# Patient Record
Sex: MALE | Race: WHITE | NOT HISPANIC OR LATINO | Employment: OTHER | URBAN - METROPOLITAN AREA
[De-identification: names, ages, dates, MRNs, and addresses within clinical notes are randomized per-mention and may not be internally consistent; named-entity substitution may affect disease eponyms.]

---

## 2017-02-06 ENCOUNTER — OFFICE VISIT (OUTPATIENT)
Dept: OPHTHALMOLOGY | Facility: CLINIC | Age: 82
End: 2017-02-06
Payer: MEDICARE

## 2017-02-06 DIAGNOSIS — H52.7 REFRACTIVE ERROR: ICD-10-CM

## 2017-02-06 DIAGNOSIS — E11.65 UNCONTROLLED TYPE 2 DIABETES MELLITUS WITH BOTH EYES AFFECTED BY RETINOPATHY WITHOUT MACULAR EDEMA, WITH LONG-TERM CURRENT USE OF INSULIN, UNSPECIFIED RETINOPATHY SEVERITY: Primary | ICD-10-CM

## 2017-02-06 DIAGNOSIS — H04.129 DRY EYE: ICD-10-CM

## 2017-02-06 DIAGNOSIS — Z96.1 PSEUDOPHAKIA: ICD-10-CM

## 2017-02-06 DIAGNOSIS — E11.319 UNCONTROLLED TYPE 2 DIABETES MELLITUS WITH BOTH EYES AFFECTED BY RETINOPATHY WITHOUT MACULAR EDEMA, WITH LONG-TERM CURRENT USE OF INSULIN, UNSPECIFIED RETINOPATHY SEVERITY: Primary | ICD-10-CM

## 2017-02-06 PROCEDURE — 99212 OFFICE O/P EST SF 10 MIN: CPT | Mod: PBBFAC,PO | Performed by: OPHTHALMOLOGY

## 2017-02-06 PROCEDURE — 92014 COMPRE OPH EXAM EST PT 1/>: CPT | Mod: S$PBB,,, | Performed by: OPHTHALMOLOGY

## 2017-02-06 PROCEDURE — 99999 PR PBB SHADOW E&M-EST. PATIENT-LVL II: CPT | Mod: PBBFAC,,, | Performed by: OPHTHALMOLOGY

## 2017-02-06 RX ORDER — ATORVASTATIN CALCIUM 40 MG/1
TABLET, FILM COATED ORAL
COMMUNITY
Start: 2017-01-01

## 2017-02-06 RX ORDER — MUPIROCIN 20 MG/G
OINTMENT TOPICAL
COMMUNITY
Start: 2015-04-26

## 2017-02-06 RX ORDER — GABAPENTIN 600 MG/1
TABLET ORAL
COMMUNITY
Start: 2017-02-02 | End: 2017-05-02 | Stop reason: SDUPTHER

## 2017-02-06 RX ORDER — MULTIVITAMIN
1 TABLET ORAL
COMMUNITY

## 2017-02-06 RX ORDER — FERROUS SULFATE 325(65) MG
325 TABLET ORAL 2 TIMES DAILY
COMMUNITY

## 2017-02-06 RX ORDER — TIZANIDINE HYDROCHLORIDE 4 MG/1
CAPSULE, GELATIN COATED ORAL
COMMUNITY
Start: 2015-10-22 | End: 2016-10-22

## 2017-02-06 NOTE — MR AVS SNAPSHOT
[facility] - Ophthalmology  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  Call for this free service at [phone].             Medications           Message regarding Medications     Verify the changes and/or additions to your medication regime listed below are the same as discussed with your clinician today.  If any of these changes or additions are incorrect, please notify your healthcare provider.             Verify that the below list of medications is an accurate representation of the medications you are currently taking.  If none reported, the list may be blank. If incorrect, please contact your healthcare provider. Carry this list with you in case of emergency.           Current Medications     amlodipine (NORVASC) 5 MG tablet     atorvastatin (LIPITOR) 40 MG tablet     bumetanide (BUMEX) 1 MG tablet     citalopram (CELEXA) 20 MG tablet     ferrous sulfate 325 mg (65 mg iron) Tab tablet Take 325 mg by mouth.    fluorometholone 0.1% (FML) 0.1 % DrpS Place 1 drop into both eyes once daily.    gabapentin (NEURONTIN) 600 MG tablet     HUMALOG 100 unit/mL  injection     LANTUS 100 unit/mL injection     multivitamin (THERAGRAN) per tablet Take 1 tablet by mouth.    mupirocin (BACTROBAN) 2 % ointment     omeprazole (PRILOSEC) 20 MG capsule Take by mouth.    sodium chloride 0.9% 0.9 % SolP 100 mL with doxycycline 100 mg SolR 100 mg     terazosin (HYTRIN) 5 MG capsule Take by mouth.    tramadol (ULTRAM) 50 mg tablet     benazepril (LOTENSIN) 20 MG tablet     cephALEXin (KEFLEX) 500 MG capsule            Clinical Reference Information           Allergies as of 2/6/2017     Clindamycin    Indomethacin Sodium      Immunizations Administered on Date of Encounter - 2/6/2017     None      [facility] Sign-Up     Activating your [facility] account is as easy as 1-2-3!     1) Visit [email or web address], select Sign Up Now, enter this activation code and your date of birth, then select Next.  [email or web address]  Expires: 3/23/2017  3:14 PM      2) Create a username and password to use when you visit [facility] in the future and select a security question in case you lose your password and select Next.    3) Enter your e-mail address and click Sign Up!    Additional Information  If you have questions, please e-mail [email or web address] or call [phone] to talk to our [facility] staff. Remember, [facility] is NOT to be used for urgent needs. For medical emergencies, dial 911.         Language Assistance Services     ATTENTION: Language assistance services are available, free of charge. Please call [phone].      ATENCIÓN: Si habla gi, tiene a pantoja disposición servicios gratuitos de asistencia lingüística. Llame al [phone].     CHÚ Ý: N?u b?n nói Ti?ng Vi?t, có các d?ch v? h? tr? ngôn ng? mi?n phí dành cho b?n. G?i s? [phone].         [facility] complies with applicable Federal civil rights laws and does not discriminate on the basis of race, color, national origin, age, disability, or sex.

## 2017-02-06 NOTE — PROGRESS NOTES
SUBJECTIVE:   Clarence Reina is a 84 y.o. male   Corrected distance visual acuity was 20/40 +2 in the right eye and 20/40 +1 in the left eye.   Chief Complaint   Patient presents with    Annual Exam    Diabetic Eye Exam     Latest BS reading 163 yesterday evening        HPI:  HPI     Diabetic Eye Exam    Additional comments: Latest BS reading 163 yesterday evening           Comments   Pt states eyes are the same since last year, except there feels like a   boulder in his left eye. No actual pain, but discomfort.     1. DM with BDR (sees Dr. Bill)  2. PCIOL Ou  3. H/o Bell's Palsy  4. Dry Eyes   5. H/o conj. FB  6. Refractive Error    FML OU QD  Refresh OU       Last edited by Maycol Lopes, Patient Care Assistant on 2/6/2017  2:03   PM. (History)        Assessment /Plan :  1. 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 Reviewed diabetic eye precautions including avoiding tobacco use,  Good glucose control, and importance of regular follow up.      2. Pseudophakia    3. Dry eye    4. Refractive error      RTC in 1 year or prn any changes

## 2017-03-27 ENCOUNTER — LAB VISIT (OUTPATIENT)
Dept: LAB | Facility: HOSPITAL | Age: 82
End: 2017-03-27
Attending: INTERNAL MEDICINE
Payer: MEDICARE

## 2017-03-27 ENCOUNTER — OFFICE VISIT (OUTPATIENT)
Dept: GASTROENTEROLOGY | Facility: CLINIC | Age: 82
End: 2017-03-27
Payer: MEDICARE

## 2017-03-27 VITALS — SYSTOLIC BLOOD PRESSURE: 94 MMHG | HEART RATE: 70 BPM | DIASTOLIC BLOOD PRESSURE: 79 MMHG

## 2017-03-27 DIAGNOSIS — K83.1 BILIARY STRICTURE: Primary | ICD-10-CM

## 2017-03-27 DIAGNOSIS — E10.9 TYPE 1 DIABETES MELLITUS WITHOUT COMPLICATION: ICD-10-CM

## 2017-03-27 DIAGNOSIS — K83.1 BILIARY STRICTURE: ICD-10-CM

## 2017-03-27 LAB
ALBUMIN SERPL BCP-MCNC: 2.1 G/DL
ALP SERPL-CCNC: 3044 U/L
ALT SERPL W/O P-5'-P-CCNC: 253 U/L
ANION GAP SERPL CALC-SCNC: 15 MMOL/L
APTT BLDCRRT: 25.5 SEC
AST SERPL-CCNC: 558 U/L
BASOPHILS # BLD AUTO: 0.01 K/UL
BASOPHILS NFR BLD: 0.1 %
BILIRUB SERPL-MCNC: 3 MG/DL
BUN SERPL-MCNC: 47 MG/DL
CALCIUM SERPL-MCNC: 8.4 MG/DL
CHLORIDE SERPL-SCNC: 90 MMOL/L
CO2 SERPL-SCNC: 32 MMOL/L
CREAT SERPL-MCNC: 3.3 MG/DL
DIFFERENTIAL METHOD: ABNORMAL
EOSINOPHIL # BLD AUTO: 0.5 K/UL
EOSINOPHIL NFR BLD: 6 %
ERYTHROCYTE [DISTWIDTH] IN BLOOD BY AUTOMATED COUNT: 16.1 %
EST. GFR  (AFRICAN AMERICAN): 18.8 ML/MIN/1.73 M^2
EST. GFR  (NON AFRICAN AMERICAN): 16.2 ML/MIN/1.73 M^2
GLUCOSE SERPL-MCNC: 85 MG/DL
HCT VFR BLD AUTO: 31.8 %
HGB BLD-MCNC: 10.6 G/DL
INR PPP: 1.2
LYMPHOCYTES # BLD AUTO: 1.2 K/UL
LYMPHOCYTES NFR BLD: 14.8 %
MCH RBC QN AUTO: 29.3 PG
MCHC RBC AUTO-ENTMCNC: 33.3 %
MCV RBC AUTO: 88 FL
MONOCYTES # BLD AUTO: 0.3 K/UL
MONOCYTES NFR BLD: 4.3 %
NEUTROPHILS # BLD AUTO: 5.9 K/UL
NEUTROPHILS NFR BLD: 74.5 %
PLATELET # BLD AUTO: 295 K/UL
PMV BLD AUTO: 11.7 FL
POTASSIUM SERPL-SCNC: 2.9 MMOL/L
PROT SERPL-MCNC: 7.1 G/DL
PROTHROMBIN TIME: 12.4 SEC
RBC # BLD AUTO: 3.62 M/UL
SODIUM SERPL-SCNC: 137 MMOL/L
WBC # BLD AUTO: 7.89 K/UL

## 2017-03-27 PROCEDURE — 99999 PR PBB SHADOW E&M-EST. PATIENT-LVL II: CPT | Mod: PBBFAC,,, | Performed by: INTERNAL MEDICINE

## 2017-03-27 PROCEDURE — 80053 COMPREHEN METABOLIC PANEL: CPT

## 2017-03-27 PROCEDURE — 85610 PROTHROMBIN TIME: CPT

## 2017-03-27 PROCEDURE — 85025 COMPLETE CBC W/AUTO DIFF WBC: CPT

## 2017-03-27 PROCEDURE — 99203 OFFICE O/P NEW LOW 30 MIN: CPT | Mod: S$PBB,,, | Performed by: INTERNAL MEDICINE

## 2017-03-27 PROCEDURE — 36415 COLL VENOUS BLD VENIPUNCTURE: CPT

## 2017-03-27 PROCEDURE — 85730 THROMBOPLASTIN TIME PARTIAL: CPT

## 2017-03-27 RX ORDER — METOLAZONE 5 MG/1
TABLET ORAL
Refills: 4 | COMMUNITY
Start: 2017-02-15

## 2017-03-27 RX ORDER — SODIUM CHLORIDE 9 MG/ML
INJECTION, SOLUTION INTRAVENOUS CONTINUOUS
Status: CANCELLED | OUTPATIENT
Start: 2017-03-27

## 2017-03-27 RX ORDER — METOPROLOL SUCCINATE 25 MG/1
25 TABLET, EXTENDED RELEASE ORAL DAILY
COMMUNITY

## 2017-03-27 NOTE — LETTER
March 29, 2017      Nestor Darnell MD  [address] [address]           [facility] [place]  [address]  [place] [address]  Phone: [phone]          Patient: Clarence Reina   MR Number: [id number]   YOB: 1932   Date of Visit: 3/27/2017       Dear Dr. Nestor Darnell:    Thank you for referring Clarence Reina to me for evaluation. Attached you will find relevant portions of my assessment and plan of care.    If you have questions, please do not hesitate to call me. I look forward to following Clarence Reina along with you.    Sincerely,    Blaise Carrera MD    Enclosure  CC:  No Recipients    If you would like to receive this communication electronically, please contact [email or web address] or [phone] to request more information on [facility] access.    For providers and/or their staff who would like to refer a patient to [facility], please contact us through our one-stop-shop provider referral line, Srinivasan Khan, at [phone].    If you feel you have received this communication in error or would no longer like to receive these types of communications, please e-mail [email or web address]

## 2017-03-27 NOTE — PROGRESS NOTES
Subjective:       Patient ID: Clarence Reina is a 84 y.o. male.    Chief Complaint: GI Problem    HPI   Abdominal Pain/ wt loss Bile duct stricture   Patient complains of abdominal pain . The pain is described as , and is moderare in intensity. The patient is experiencing diffuse  pain without radiation. Onset was weeks ago ago. Symptoms have been progressive . Aggravating factors: meals.  Alleviating factors: no . Associated symptom: no . The patient denies chest pain .    Prior ERCP showing stricture suggestive of malignanacy brushings negative. Here for an EUS .     Review of Systems   Constitutional: Negative for chills, fever and unexpected weight change.   HENT: Negative for congestion, trouble swallowing and voice change.    Respiratory: Negative for cough, choking and wheezing.    Cardiovascular: Negative for chest pain and leg swelling.   Gastrointestinal: Negative for abdominal distention and abdominal pain.   Genitourinary: Negative for dysuria and urgency.   Musculoskeletal: Negative for arthralgias, neck pain and neck stiffness.   Skin: Negative for pallor and rash.   Neurological: Negative for dizziness, tremors, seizures, weakness, numbness and headaches.   Psychiatric/Behavioral: Negative for agitation and confusion. The patient is not nervous/anxious.          Objective:           Physical Exam   Constitutional: He is oriented to person, place, and time. He appears well-developed and well-nourished.   Eyes: EOM are normal. Pupils are equal, round, and reactive to light.   Neck: Normal range of motion. Neck supple. No JVD present. No tracheal deviation present.   Cardiovascular: Normal rate and regular rhythm.  Exam reveals no gallop and no friction rub.    Pulmonary/Chest: Effort normal and breath sounds normal. He has no wheezes. He has no rales.   Abdominal: Soft. Bowel sounds are normal. He exhibits no mass. There is no tenderness.   Musculoskeletal: Normal range of motion. He exhibits no  tenderness or deformity.   Neurological: He is alert and oriented to person, place, and time. He displays normal reflexes. Coordination normal.   Skin: No rash noted. No pallor.   Psychiatric: His behavior is normal. Thought content normal.   Nursing note and vitals reviewed.        Assessment:       Biliary stricture ( indeterminate )  Plan:      EUs/ERCP spyglass   CA-19-9  Labs today

## 2017-04-03 ENCOUNTER — TELEPHONE (OUTPATIENT)
Dept: GASTROENTEROLOGY | Facility: CLINIC | Age: 82
End: 2017-04-03

## 2017-04-03 NOTE — TELEPHONE ENCOUNTER
----- Message from Cristin García sent at 4/3/2017  1:31 PM CDT -----  Contact: pt's wife [phone]  Patient requesting to speak with you regarding his procedure. Please advise.

## 2017-04-04 ENCOUNTER — ANESTHESIA EVENT (OUTPATIENT)
Dept: ENDOSCOPY | Facility: HOSPITAL | Age: 82
End: 2017-04-04
Payer: MEDICARE

## 2017-04-04 ENCOUNTER — TELEPHONE (OUTPATIENT)
Dept: OPHTHALMOLOGY | Facility: CLINIC | Age: 82
End: 2017-04-04

## 2017-04-04 NOTE — TELEPHONE ENCOUNTER
Spoke with pt/wife.  Will leave Rx for pt. At [facility] desk.  They will pick it up in the next few days.

## 2017-04-04 NOTE — TELEPHONE ENCOUNTER
----- Message from Kari Blake sent at 4/4/2017  2:51 PM CDT -----  Contact: wife  Please call pt @ [phone] regarding eye glasses/script, pt have some questions.

## 2017-04-05 ENCOUNTER — ANESTHESIA (OUTPATIENT)
Dept: ENDOSCOPY | Facility: HOSPITAL | Age: 82
End: 2017-04-05
Payer: MEDICARE

## 2017-04-05 ENCOUNTER — HOSPITAL ENCOUNTER (OUTPATIENT)
Facility: HOSPITAL | Age: 82
Discharge: HOME | End: 2017-04-05
Attending: INTERNAL MEDICINE | Admitting: INTERNAL MEDICINE
Payer: MEDICARE

## 2017-04-05 DIAGNOSIS — K83.1 BILIARY STRICTURE: ICD-10-CM

## 2017-04-05 DIAGNOSIS — E10.9 TYPE 1 DIABETES MELLITUS WITHOUT COMPLICATION: ICD-10-CM

## 2017-04-05 LAB
ALBUMIN SERPL BCP-MCNC: 2.2 G/DL
ALP SERPL-CCNC: 1772 U/L
ALT SERPL W/O P-5'-P-CCNC: 115 U/L
ANION GAP SERPL CALC-SCNC: 10 MMOL/L
ANION GAP SERPL CALC-SCNC: 10 MMOL/L
AST SERPL-CCNC: 153 U/L
BILIRUB SERPL-MCNC: 1.6 MG/DL
BUN SERPL-MCNC: 35 MG/DL
BUN SERPL-MCNC: 35 MG/DL
CALCIUM SERPL-MCNC: 8.2 MG/DL
CALCIUM SERPL-MCNC: 8.2 MG/DL
CHLORIDE SERPL-SCNC: 100 MMOL/L
CHLORIDE SERPL-SCNC: 100 MMOL/L
CO2 SERPL-SCNC: 25 MMOL/L
CO2 SERPL-SCNC: 25 MMOL/L
CREAT SERPL-MCNC: 2.8 MG/DL
CREAT SERPL-MCNC: 2.8 MG/DL
EST. GFR  (AFRICAN AMERICAN): 23 ML/MIN/1.73 M^2
EST. GFR  (AFRICAN AMERICAN): 23 ML/MIN/1.73 M^2
EST. GFR  (NON AFRICAN AMERICAN): 20 ML/MIN/1.73 M^2
EST. GFR  (NON AFRICAN AMERICAN): 20 ML/MIN/1.73 M^2
GLUCOSE SERPL-MCNC: 71 MG/DL
GLUCOSE SERPL-MCNC: 71 MG/DL
POCT GLUCOSE: 85 MG/DL (ref 70–110)
POTASSIUM SERPL-SCNC: 3.7 MMOL/L
POTASSIUM SERPL-SCNC: 3.7 MMOL/L
PROT SERPL-MCNC: 6 G/DL
SODIUM SERPL-SCNC: 135 MMOL/L
SODIUM SERPL-SCNC: 135 MMOL/L

## 2017-04-05 PROCEDURE — 25000003 PHARM REV CODE 250: Performed by: INTERNAL MEDICINE

## 2017-04-05 PROCEDURE — 37000009 HC ANESTHESIA EA ADD 15 MINS: Performed by: INTERNAL MEDICINE

## 2017-04-05 PROCEDURE — 43238 EGD US FINE NEEDLE BX/ASPIR: CPT | Mod: ,,, | Performed by: INTERNAL MEDICINE

## 2017-04-05 PROCEDURE — 88307 TISSUE EXAM BY PATHOLOGIST: CPT | Performed by: PATHOLOGY

## 2017-04-05 PROCEDURE — 43242 EGD US FINE NEEDLE BX/ASPIR: CPT | Performed by: INTERNAL MEDICINE

## 2017-04-05 PROCEDURE — 88333 PATH CONSLTJ SURG CYTO XM 1: CPT | Mod: 26,,, | Performed by: PATHOLOGY

## 2017-04-05 PROCEDURE — 27201628 HC NEEDLE, EUSN-1, EUSN-3: Performed by: INTERNAL MEDICINE

## 2017-04-05 PROCEDURE — 80053 COMPREHEN METABOLIC PANEL: CPT

## 2017-04-05 PROCEDURE — 93005 ELECTROCARDIOGRAM TRACING: CPT

## 2017-04-05 PROCEDURE — 25000003 PHARM REV CODE 250: Performed by: NURSE ANESTHETIST, CERTIFIED REGISTERED

## 2017-04-05 PROCEDURE — 37000008 HC ANESTHESIA 1ST 15 MINUTES: Performed by: INTERNAL MEDICINE

## 2017-04-05 PROCEDURE — 82962 GLUCOSE BLOOD TEST: CPT | Performed by: INTERNAL MEDICINE

## 2017-04-05 PROCEDURE — 88307 TISSUE EXAM BY PATHOLOGIST: CPT | Mod: 26,,, | Performed by: PATHOLOGY

## 2017-04-05 PROCEDURE — 63600175 PHARM REV CODE 636 W HCPCS: Performed by: NURSE ANESTHETIST, CERTIFIED REGISTERED

## 2017-04-05 RX ORDER — ROCURONIUM BROMIDE 10 MG/ML
INJECTION, SOLUTION INTRAVENOUS
Status: DISCONTINUED | OUTPATIENT
Start: 2017-04-05 | End: 2017-04-05

## 2017-04-05 RX ORDER — SUCCINYLCHOLINE CHLORIDE 20 MG/ML
INJECTION INTRAMUSCULAR; INTRAVENOUS
Status: DISCONTINUED | OUTPATIENT
Start: 2017-04-05 | End: 2017-04-05

## 2017-04-05 RX ORDER — SODIUM CHLORIDE 9 MG/ML
INJECTION, SOLUTION INTRAVENOUS CONTINUOUS
Status: DISCONTINUED | OUTPATIENT
Start: 2017-04-05 | End: 2017-04-05 | Stop reason: HOSPADM

## 2017-04-05 RX ORDER — PHENYLEPHRINE HYDROCHLORIDE 10 MG/ML
INJECTION INTRAVENOUS
Status: DISCONTINUED | OUTPATIENT
Start: 2017-04-05 | End: 2017-04-05

## 2017-04-05 RX ORDER — LIDOCAINE HCL/PF 100 MG/5ML
SYRINGE (ML) INTRAVENOUS
Status: DISCONTINUED | OUTPATIENT
Start: 2017-04-05 | End: 2017-04-05

## 2017-04-05 RX ORDER — PROPOFOL 10 MG/ML
VIAL (ML) INTRAVENOUS
Status: DISCONTINUED | OUTPATIENT
Start: 2017-04-05 | End: 2017-04-05

## 2017-04-05 RX ORDER — FENTANYL CITRATE 50 UG/ML
INJECTION, SOLUTION INTRAMUSCULAR; INTRAVENOUS
Status: DISCONTINUED | OUTPATIENT
Start: 2017-04-05 | End: 2017-04-05

## 2017-04-05 RX ADMIN — SODIUM CHLORIDE: 0.9 INJECTION, SOLUTION INTRAVENOUS at 12:04

## 2017-04-05 RX ADMIN — PHENYLEPHRINE HYDROCHLORIDE 200 MCG: 10 INJECTION INTRAVENOUS at 12:04

## 2017-04-05 RX ADMIN — EPHEDRINE SULFATE 15 MG: 50 INJECTION, SOLUTION INTRAMUSCULAR; INTRAVENOUS; SUBCUTANEOUS at 01:04

## 2017-04-05 RX ADMIN — ROCURONIUM BROMIDE 10 MG: 10 INJECTION, SOLUTION INTRAVENOUS at 12:04

## 2017-04-05 RX ADMIN — PHENYLEPHRINE HYDROCHLORIDE 100 MCG: 10 INJECTION INTRAVENOUS at 12:04

## 2017-04-05 RX ADMIN — EPHEDRINE SULFATE 10 MG: 50 INJECTION, SOLUTION INTRAMUSCULAR; INTRAVENOUS; SUBCUTANEOUS at 01:04

## 2017-04-05 RX ADMIN — EPHEDRINE SULFATE 10 MG: 50 INJECTION, SOLUTION INTRAMUSCULAR; INTRAVENOUS; SUBCUTANEOUS at 12:04

## 2017-04-05 RX ADMIN — LIDOCAINE HYDROCHLORIDE 100 MG: 20 INJECTION, SOLUTION INTRAVENOUS at 12:04

## 2017-04-05 RX ADMIN — SUCCINYLCHOLINE CHLORIDE 100 MG: 20 INJECTION, SOLUTION INTRAMUSCULAR; INTRAVENOUS at 12:04

## 2017-04-05 RX ADMIN — FENTANYL CITRATE 100 MCG: 50 INJECTION, SOLUTION INTRAMUSCULAR; INTRAVENOUS at 12:04

## 2017-04-05 RX ADMIN — PROPOFOL 120 MG: 10 INJECTION, EMULSION INTRAVENOUS at 12:04

## 2017-04-05 NOTE — ANESTHESIA PREPROCEDURE EVALUATION
04/05/2017  Clarence Reina is a 84 y.o., male for upper EUS and ERCP under GETA. Had ERCP in [place] recently with [facility] per family.     [facility] Anesthesia Evaluation     I have reviewed the Nursing Notes.   I have reviewed the Medications.     Review of Systems  Anesthesia Hx:   Denies Personal Hx of Anesthesia complications.   Social:  No Alcohol Use, Non-Smoker   Hematology/Oncology:         -- Anemia:   Cardiovascular:   Exercise tolerance: poor Hypertension Denies MI.  Denies CAD.    ECG has been reviewed. Family reports diastolic dysfxn, nml valves and function last echo; Has become decompensated over last 3 months with 3 hospital admissions related to liver and kidney dz.    Renal/:   Chronic Renal Disease, CRI    Hepatic/GI:   Liver Disease, (elevated enzymes) Biliary stricture   Musculoskeletal:   Arthritis     Neurological:   Peripheral Neuropathy    Endocrine:   Diabetes        Physical Exam  General:  Obesity    Airway/Jaw/Neck:  Airway Findings: Mouth Opening: Small, but > 3cm Tongue: Large  General Airway Assessment: Adult  Mallampati: III  TM Distance: Normal, at least 6 cm  Jaw/Neck Findings:  Neck ROM: Extension Decreased, Mild      Dental:  Dental Findings: Periodontal disease, Severe    Chest/Lungs:  Chest/Lungs Findings: Clear to auscultation, Normal Respiratory Rate     Heart/Vascular:  Heart Findings: Rate: Normal  Rhythm: Regular Rhythm        Mental Status:  Mental Status Findings:  Alert and Oriented       Lab Results   Component Value Date    WBC 7.89 03/27/2017    HGB 10.6 (L) 03/27/2017    HCT 31.8 (L) 03/27/2017     03/27/2017     (H) 04/05/2017     (H) 04/05/2017     (L) 04/05/2017     (L) 04/05/2017    K 3.7 04/05/2017    K 3.7 04/05/2017     04/05/2017     04/05/2017    CREATININE 2.8 (H) 04/05/2017    CREATININE 2.8 (H) 04/05/2017    BUN  35 (H) 04/05/2017    BUN 35 (H) 04/05/2017    CO2 25 04/05/2017    CO2 25 04/05/2017    INR 1.2 03/27/2017         Anesthesia Plan  Type of Anesthesia, risks & benefits discussed:  Anesthesia Type:  general  Patient's Preference:   Intra-op Monitoring Plan:   Intra-op Monitoring Plan Comments:   Post Op Pain Control Plan:   Post Op Pain Control Plan Comments:   Induction:    Beta Blocker:  Patient is on a Beta-Blocker and has received one dose within the past 24 hours (No further documentation required).       Informed Consent: Patient understands risks and agrees with Anesthesia plan.  Questions answered. Anesthesia consent signed with patient.  ASA Score: 3     Day of Surgery Review of History & Physical:            Ready For Surgery From Anesthesia Perspective.

## 2017-04-05 NOTE — IP AVS SNAPSHOT
[facility]  [address]  [place] [address]  Phone: [phone]           Patient Discharge Instructions   Our goal is to set you up for success. This packet includes information on your condition, medications, and your home care.  It will help you care for yourself to prevent having to return to the hospital.     Please ask your nurse if you have any questions.      There are many details to remember when preparing to leave the hospital. Here is what you will need to do:    1. Take your medicine. If you are prescribed medications, review your Medication List on the following pages. You may have new medications to  at the pharmacy and others that you'll need to stop taking. Review the instructions for how and when to take your medications. Talk with your doctor or nurses if you are unsure of what to do.     2. Go to your follow-up appointments. Specific follow-up information is listed in the following pages. Your may be contacted by a nurse or clinical provider about future appointments. Be sure we have all of the phone numbers to reach you. Please contact your provider's office if you are unable to make an appointment.     3. Watch for warning signs. Your doctor or nurse will give you detailed warning signs to watch for and when to call for assistance. These instructions may also include educational information about your condition. If you experience any of warning signs to your health, call your doctor.               ** Verify the list of medication(s) below is accurate and up to date. Carry this with you in case of emergency. If your medications have changed, please notify your healthcare provider.             Medication List      ASK your doctor about these medications        Additional Info                      atorvastatin 40 MG tablet   Commonly known as:  LIPITOR   Refills:  0      Begin Date    AM    Noon    PM    Bedtime       bumetanide 1 MG tablet   Commonly known as:  BUMEX    Refills:  0    Instructions:  Take by mouth 2 (two) times daily.     Begin Date    AM    Noon    PM    Bedtime       citalopram 20 MG tablet   Commonly known as:  CELEXA   Refills:  0      Begin Date    AM    Noon    PM    Bedtime       ferrous sulfate 325 mg (65 mg iron) Tab tablet   Refills:  0   Dose:  325 mg    Instructions:  Take 325 mg by mouth 2 (two) times daily.     Begin Date    AM    Noon    PM    Bedtime       fluorometholone 0.1% 0.1 % Drps   Commonly known as:  FML   Refills:  0   Dose:  1 drop    Instructions:  Place 1 drop into both eyes once daily.     Begin Date    AM    Noon    PM    Bedtime       gabapentin 600 MG tablet   Commonly known as:  NEURONTIN   Refills:  0      Begin Date    AM    Noon    PM    Bedtime       HUMALOG 100 unit/mL injection   Refills:  0   Generic drug:  insulin lispro      Begin Date    AM    Noon    PM    Bedtime       LANTUS 100 unit/mL injection   Refills:  0   Generic drug:  insulin glargine      Begin Date    AM    Noon    PM    Bedtime       loratadine 5 mg chewable tablet   Commonly known as:  CLARITIN   Refills:  0   Dose:  5 mg    Instructions:  Take 5 mg by mouth once daily.     Begin Date    AM    Noon    PM    Bedtime       metOLazone 5 MG tablet   Commonly known as:  ZAROXOLYN   Refills:  4    Instructions:  TK 1 T PO QOD     Begin Date    AM    Noon    PM    Bedtime       metoprolol succinate 25 MG 24 hr tablet   Commonly known as:  TOPROL-XL   Refills:  0   Dose:  25 mg    Instructions:  Take 25 mg by mouth once daily.     Begin Date    AM    Noon    PM    Bedtime       multivitamin per tablet   Commonly known as:  THERAGRAN   Refills:  0   Dose:  1 tablet    Instructions:  Take 1 tablet by mouth.     Begin Date    AM    Noon    PM    Bedtime       mupirocin 2 % ointment   Commonly known as:  BACTROBAN   Refills:  0      Begin Date    AM    Noon    PM    Bedtime       omeprazole 20 MG capsule   Commonly known as:  PRILOSEC   Refills:  0    Instructions:   Take by mouth.     Begin Date    AM    Noon    PM    Bedtime       sodium chloride 0.9% 0.9 % SolP 100 mL with doxycycline 100 mg SolR 100 mg   Refills:  0      Begin Date    AM    Noon    PM    Bedtime       terazosin 5 MG capsule   Commonly known as:  HYTRIN   Refills:  0    Instructions:  Take by mouth.     Begin Date    AM    Noon    PM    Bedtime       tramadol 50 mg tablet   Commonly known as:  ULTRAM   Refills:  0      Begin Date    AM    Noon    PM    Bedtime                  Please bring to all follow up appointments:    1. A copy of your discharge instructions.  2. All medicines you are currently taking in their original bottles.  3. Identification and insurance card.    Please arrive 15 minutes ahead of scheduled appointment time.    Please call 24 hours in advance if you must reschedule your appointment and/or time.            Discharge Instructions       Post Upper EUS Instructions:     Clarence Reina  4/5/2017  Blaise Carrera MD    1. Do Not eat or drink anything for 1 hour. Try sips of water first. If tolerated, resume your regular diet or one recommended by your physician.  2. Do not drive, or operate machinery, make critical decisions, or do activities that require coordination or balance for 24 hours.  3. You may experience a sore throat for 24 to 48 hours. You may use throat lozenges or gargle with warm salt water to relieve the discomfort.  4. Because air was put into your stomach during the procedure, you may experience some belching.   Go directly to the emergency room if you notice any of the following:                              Chills and/or fever over 101                Persistent vomiting or vomiting with blood                Severe abdominal pain, other than gas cramps                Severe chest pain                Black, tarry stools    If you have any questions or problems, please call your Physician:    Blaise Carrera MD    Lab Results: [phone]    If a complication or  "emergency situation arises and you are unable to reach your Physician - GO TO THE EMERGENCY ROOM.          Admission Information     Date & Time Provider Department CSN    4/5/2017 10:26 AM Blaise Carrera MD [facility] [id number]      Care Providers     Provider Role Specialty Primary office phone    Blaise Carrera MD Attending Provider Gastroenterology [phone]    Blaise Carrera MD Surgeon  Gastroenterology [phone]      Your Vitals Were     BP Pulse Temp Resp Height Weight    105/44 69 97.8 °F (36.6 °C) 16 5' 8" (1.727 m) 105.7 kg (233 lb)    SpO2 BMI             93% 35.43 kg/m2         Recent Lab Values     No lab values to display.      Pending Labs     Order Current Status    Cytology Specimen-FNA-Radiology Assisted with Path Adequacy-Core BX In process      Allergies as of 4/5/2017        Reactions    Clindamycin Hives    Other reaction(s): Swelling    Indomethacin Sodium     rash      [facility]     [facility] Nurse Care Line - 24/7 Assistance  Unless otherwise directed by your provider, please contact [facility], our nurse care line that is available for 24/7 assistance.     Registered nurses in the [facility] Center provide clinical advisement, health education, appointment booking, and other advisory services.  Call for this free service at [phone].        Advance Directives     An advance directive is a document which, in the event you are no longer able to make decisions for yourself, tells your healthcare team what kind of treatment you do or do not want to receive, or who you would like to make those decisions for you.  If you do not currently have an advance directive, [facility] encourages you to create one.  For more information call:  [phone]), [phone] ([phone]),  or log on to [email or web address].        Language Assistance Services     ATTENTION: Language assistance services are available, free of charge. Please " call [phone].      ATENCIÓN: Si habla espdaksha, tiene a pantoja disposición servicios gratuitos de asistencia lingüística. Llame al [phone].     CHÚ Ý: N?u b?n nói Ti?ng Vi?t, có các d?ch v? h? tr? ngôn ng? mi?n phí dành cho b?n. G?i s? [phone].        Diabetes Discharge Instructions                                   [facility] Sign-Up     Activating your [facility] account is as easy as 1-2-3!     1) Visit [email or web address], select Sign Up Now, enter this activation code and your date of birth, then select Next.  [email or web address]  Expires: 5/20/2017  2:51 PM      2) Create a username and password to use when you visit [facility] in the future and select a security question in case you lose your password and select Next.    3) Enter your e-mail address and click Sign Up!    Additional Information  If you have questions, please e-mail [email or web address] or call [phone] to talk to our [facility] staff. Remember, [facility] is NOT to be used for urgent needs. For medical emergencies, dial 911.          [facility] complies with applicable Federal civil rights laws and does not discriminate on the basis of race, color, national origin, age, disability, or sex.

## 2017-04-05 NOTE — ANESTHESIA POSTPROCEDURE EVALUATION
"Anesthesia Post Evaluation    Patient: Clarence Reina    Procedure(s) Performed: Procedure(s) (LRB):  ULTRASOUND-ENDOSCOPIC-UPPER (N/A)  ERCP (N/A)    Final Anesthesia Type: general  Patient location during evaluation: PACU  Patient participation: Yes- Able to Participate  Level of consciousness: awake  Post-procedure vital signs: reviewed and stable  Pain management: adequate  Airway patency: patent  PONV status at discharge: No PONV  Anesthetic complications: no      Cardiovascular status: hemodynamically stable and blood pressure returned to baseline  Respiratory status: room air, spontaneous ventilation and unassisted  Hydration status: euvolemic  Follow-up not needed.        Visit Vitals    BP (!) 108/56    Pulse 76    Temp 36.6 °C (97.8 °F)    Resp 16    Ht 5' 8" (1.727 m)    Wt 105.7 kg (233 lb)    SpO2 (!) 94%    BMI 35.43 kg/m2       Pain/Chele Score: Pain Assessment Performed: Yes (4/5/2017  3:10 PM)  Presence of Pain: denies (4/5/2017  3:10 PM)  Chele Score: 10 (4/5/2017  3:10 PM)      "

## 2017-04-05 NOTE — PLAN OF CARE
Report given to Emma APARICIO with time allotted for questioning, verbalized understanding. Dr. Blayne haji to release patient from PACU.

## 2017-04-05 NOTE — DISCHARGE INSTRUCTIONS
Post Upper EUS Instructions:     Clarence Reina  4/5/2017  Blaise Carrera MD    1. Do Not eat or drink anything for 1 hour. Try sips of water first. If tolerated, resume your regular diet or one recommended by your physician.  2. Do not drive, or operate machinery, make critical decisions, or do activities that require coordination or balance for 24 hours.  3. You may experience a sore throat for 24 to 48 hours. You may use throat lozenges or gargle with warm salt water to relieve the discomfort.  4. Because air was put into your stomach during the procedure, you may experience some belching.   Go directly to the emergency room if you notice any of the following:                              Chills and/or fever over 101                Persistent vomiting or vomiting with blood                Severe abdominal pain, other than gas cramps                Severe chest pain                Black, tarry stools    If you have any questions or problems, please call your Physician:    Blaise Carrera MD    Lab Results: [phone]    If a complication or emergency situation arises and you are unable to reach your Physician - GO TO THE EMERGENCY ROOM.

## 2017-04-06 VITALS
BODY MASS INDEX: 35.31 KG/M2 | HEART RATE: 76 BPM | RESPIRATION RATE: 16 BRPM | HEIGHT: 68 IN | TEMPERATURE: 98 F | OXYGEN SATURATION: 94 % | WEIGHT: 233 LBS | DIASTOLIC BLOOD PRESSURE: 56 MMHG | SYSTOLIC BLOOD PRESSURE: 108 MMHG

## 2017-04-19 ENCOUNTER — TELEPHONE (OUTPATIENT)
Dept: GASTROENTEROLOGY | Facility: CLINIC | Age: 82
End: 2017-04-19

## 2017-04-19 NOTE — TELEPHONE ENCOUNTER
Dr Carrera spoke with Annie regarding results  Procedure and pathology faxed to referring MD and sent to pt

## 2017-04-19 NOTE — TELEPHONE ENCOUNTER
----- Message from Aida Bowden sent at 4/19/2017 12:48 PM CDT -----  Contact: Annie/[phone]  Annie said patient would like to speak with you about his test results. Please advise

## 2017-05-19 ENCOUNTER — TELEPHONE (OUTPATIENT)
Dept: GASTROENTEROLOGY | Facility: CLINIC | Age: 82
End: 2017-05-19

## 2017-05-19 NOTE — TELEPHONE ENCOUNTER
----- Message from Donna Yarbrough sent at 5/17/2017  1:23 PM CDT -----  Contact: Mrs Reina(wife)/[phone]  Patient's wife would like to speak with you about his appointment to place a metal stent.  Please advise

## 2017-05-19 NOTE — TELEPHONE ENCOUNTER
----- Message from Donna Yarbrough sent at 5/17/2017  1:23 PM CDT -----  Contact: Mrs Reina(wife)/[phone]  Patient's wife would like to speak with you about his appointment to place a metal stent.  Please advise

## 2017-05-23 ENCOUNTER — TELEPHONE (OUTPATIENT)
Dept: GASTROENTEROLOGY | Facility: CLINIC | Age: 82
End: 2017-05-23

## 2017-05-23 DIAGNOSIS — K83.1 BILIARY STRICTURE: Primary | ICD-10-CM

## 2017-05-23 NOTE — TELEPHONE ENCOUNTER
Spoke with pt daughter and was able to schedule an ERCP for the pt on 6/14/17 with Dr. Carrera. Pt would like the instructions emailed to [email or web address].          ----- Message from Suki Kay sent at 5/23/2017  8:49 AM CDT -----  Contact: fede kelley [phone]  Please call dr calderon states she left two messages to discuss to schedule patient's procedure

## 2017-05-25 RX ORDER — GABAPENTIN 600 MG/1
TABLET ORAL
Qty: 270 TABLET | Refills: 2 | Status: SHIPPED | OUTPATIENT
Start: 2017-05-25

## 2017-06-12 ENCOUNTER — TELEPHONE (OUTPATIENT)
Dept: GASTROENTEROLOGY | Facility: CLINIC | Age: 82
End: 2017-06-12

## 2017-06-12 NOTE — TELEPHONE ENCOUNTER
----- Message from Cheryle Spicer sent at 6/12/2017 12:09 PM CDT -----  Contact: Susana, , [phone]  Requests to cancel 6/14 procedure appointment. Please advise.